# Patient Record
Sex: FEMALE | Race: AMERICAN INDIAN OR ALASKA NATIVE
[De-identification: names, ages, dates, MRNs, and addresses within clinical notes are randomized per-mention and may not be internally consistent; named-entity substitution may affect disease eponyms.]

---

## 2018-06-13 ENCOUNTER — HOSPITAL ENCOUNTER (EMERGENCY)
Dept: HOSPITAL 5 - ED | Age: 43
Discharge: HOME | End: 2018-06-13
Payer: MEDICAID

## 2018-06-13 VITALS — DIASTOLIC BLOOD PRESSURE: 91 MMHG | SYSTOLIC BLOOD PRESSURE: 145 MMHG

## 2018-06-13 DIAGNOSIS — G43.909: Primary | ICD-10-CM

## 2018-06-13 DIAGNOSIS — I10: ICD-10-CM

## 2018-06-13 LAB
BACTERIA #/AREA URNS HPF: (no result) /HPF
BILIRUB UR QL STRIP: (no result)
BLOOD UR QL VISUAL: (no result)
MUCOUS THREADS #/AREA URNS HPF: (no result) /HPF
PH UR STRIP: 5 [PH] (ref 5–7)
PROT UR STRIP-MCNC: (no result) MG/DL
RBC #/AREA URNS HPF: 1 /HPF (ref 0–6)
UROBILINOGEN UR-MCNC: 2 MG/DL (ref ?–2)
WBC #/AREA URNS HPF: 1 /HPF (ref 0–6)

## 2018-06-13 PROCEDURE — 81025 URINE PREGNANCY TEST: CPT

## 2018-06-13 PROCEDURE — 96372 THER/PROPH/DIAG INJ SC/IM: CPT

## 2018-06-13 PROCEDURE — 99283 EMERGENCY DEPT VISIT LOW MDM: CPT

## 2018-06-13 PROCEDURE — 81001 URINALYSIS AUTO W/SCOPE: CPT

## 2018-06-13 NOTE — EMERGENCY DEPARTMENT REPORT
ED Headache HPI





- General


Chief Complaint: Headache


Stated Complaint: HEAD PAINS


Time Seen by Provider: 06/13/18 16:09





- History of Present Illness


Initial Comments: 





Patient is a 42-year-old black female coming in with headache for the past 2-3 

days.  Patient states which takes Tylenol or Excedrin and it does subside 

slightly but is coming back.  Patient states she is under great deal stress.  

Patient denies any recent trauma no nausea vomiting or photophobia.  Patient 

states the headache is in a bandlike distribution around her head and is a 6 

out of 10 in severity.  States his aching tight feeling.


Allergies/Adverse Reactions: 


Allergies





No Known Allergies Allergy (Unverified 06/13/18 14:34)


 








Home Medications: 


Ambulatory Orders





Butalb/Acetamin/Caff -40 [Fioricet] 1 tab PO Q6HR PRN #12 tab 06/13/18 











ED Review of Systems


ROS: 


Stated complaint: HEAD PAINS


Other details as noted in HPI





Comment: All other systems reviewed and negative





ED Past Medical Hx





- Past Medical History


Previous Medical History?: Yes


Hx Hypertension: Yes


Hx Headaches / Migraines: Yes





- Surgical History


Past Surgical History?: Yes


Hx Cholecystectomy: Yes





- Social History


Smoking Status: Never Smoker


Substance Use Type: None





- Medications


Home Medications: 


 Home Medications











 Medication  Instructions  Recorded  Confirmed  Last Taken  Type


 


Butalb/Acetamin/Caff -40 1 tab PO Q6HR PRN #12 tab 06/13/18  Unknown Rx





[Fioricet]     














ED Physical Exam





- General


Limitations: No Limitations


General appearance: alert, in no apparent distress





- Head


Head exam: Present: atraumatic, normocephalic





- Eye


Eye exam: Present: normal appearance





- ENT


ENT exam: Present: mucous membranes moist





- Neck


Neck exam: Present: normal inspection





- Respiratory


Respiratory exam: Present: normal lung sounds bilaterally.  Absent: respiratory 

distress





- Cardiovascular


Cardiovascular Exam: Present: regular rate, normal rhythm.  Absent: systolic 

murmur, diastolic murmur, rubs, gallop





- GI/Abdominal


GI/Abdominal exam: Present: soft, normal bowel sounds





- Extremities Exam


Extremities exam: Present: normal inspection





- Back Exam


Back exam: Present: normal inspection





- Neurological Exam


Neurological exam: Present: alert, oriented X3





- Psychiatric


Psychiatric exam: Present: normal affect, normal mood





- Skin


Skin exam: Present: warm, dry, intact, normal color.  Absent: rash





ED Course





 Vital Signs











  06/13/18





  14:34


 


Temperature 98.8 F


 


Pulse Rate 83


 


Respiratory 18





Rate 


 


Blood Pressure 145/91


 


O2 Sat by Pulse 100





Oximetry 














ED Medical Decision Making





- Lab Data








 Lab Results











  06/13/18 Range/Units





  16:44 


 


Urine Color  Yellow  (Yellow)  


 


Urine Turbidity  Clear  (Clear)  


 


Urine pH  5.0  (5.0-7.0)  


 


Ur Specific Gravity  1.025  (1.003-1.030)  


 


Urine Protein  <15 mg/dl  (Negative)  mg/dL


 


Urine Glucose (UA)  Neg  (Negative)  mg/dL


 


Urine Ketones  Neg  (Negative)  mg/dL


 


Urine Blood  Neg  (Negative)  


 


Urine Nitrite  Neg  (Negative)  


 


Ur Reducing Substances  Not Reportable  


 


Urine Bilirubin  Neg  (Negative)  


 


Urine Ictotest  Not Reportable  


 


Urine Urobilinogen  2.0  (<2.0)  mg/dL


 


Ur Leukocyte Esterase  Neg  (Negative)  


 


Urine WBC (Auto)  1.0  (0.0-6.0)  /HPF


 


Urine RBC (Auto)  1.0  (0.0-6.0)  /HPF


 


U Epithel Cells (Auto)  5.0  (0-13.0)  /HPF


 


Urine Bacteria (Auto)  1+  (Negative)  /HPF


 


Urine Mucus  2+  /HPF


 


Urine HCG, Qual  Negative  (Negative)  














- Medical Decision Making





She'll be treated for tension headaches will be discharged home.  Neurology has 

been given for follow-up in case of meds did not help.


Critical care attestation.: 


If time is entered above; I have spent that time in minutes in the direct care 

of this critically ill patient, excluding procedure time.








ED Disposition


Clinical Impression: 


 Tension headache





Disposition: DC-01 TO HOME OR SELFCARE


Is pt being admited?: No


Does the pt Need Aspirin: No


Condition: Stable


Instructions:  Tension Headache (ED)


Prescriptions: 


Butalb/Acetamin/Caff -40 [Fioricet] 1 tab PO Q6HR PRN #12 tab


 PRN Reason: Headache


Referrals: 


ELANA LUEVANO MD [Referring] - as needed